# Patient Record
Sex: MALE | Race: WHITE | NOT HISPANIC OR LATINO | Employment: FULL TIME | URBAN - NONMETROPOLITAN AREA
[De-identification: names, ages, dates, MRNs, and addresses within clinical notes are randomized per-mention and may not be internally consistent; named-entity substitution may affect disease eponyms.]

---

## 2019-05-09 ENCOUNTER — APPOINTMENT (OUTPATIENT)
Dept: GENERAL RADIOLOGY | Facility: HOSPITAL | Age: 24
End: 2019-05-09

## 2019-05-09 ENCOUNTER — HOSPITAL ENCOUNTER (EMERGENCY)
Facility: HOSPITAL | Age: 24
Discharge: HOME | End: 2019-05-09
Admitting: EMERGENCY MEDICINE

## 2019-05-09 VITALS
DIASTOLIC BLOOD PRESSURE: 88 MMHG | BODY MASS INDEX: 34.55 KG/M2 | SYSTOLIC BLOOD PRESSURE: 137 MMHG | HEIGHT: 66 IN | TEMPERATURE: 98.6 F | OXYGEN SATURATION: 98 % | HEART RATE: 100 BPM | WEIGHT: 215 LBS | RESPIRATION RATE: 14 BRPM

## 2019-05-09 DIAGNOSIS — S86.912A KNEE STRAIN, LEFT, INITIAL ENCOUNTER: Primary | ICD-10-CM

## 2019-05-09 PROCEDURE — 99283 EMERGENCY DEPT VISIT LOW MDM: CPT

## 2019-05-09 PROCEDURE — 73562 X-RAY EXAM OF KNEE 3: CPT

## 2019-05-09 RX ORDER — NAPROXEN 500 MG/1
500 TABLET ORAL 2 TIMES DAILY PRN
Qty: 15 TABLET | Refills: 0 | Status: SHIPPED | OUTPATIENT
Start: 2019-05-09

## 2019-05-09 RX ORDER — HYDROCODONE BITARTRATE AND ACETAMINOPHEN 5; 325 MG/1; MG/1
1 TABLET ORAL EVERY 4 HOURS PRN
Qty: 12 TABLET | Refills: 0 | Status: SHIPPED | OUTPATIENT
Start: 2019-05-09

## 2019-05-09 NOTE — DISCHARGE INSTRUCTIONS
Avoid weight bearing; use crutches and knee immobilizer with ambulation; f/u with orthopedics for further evaluation- Dr. Valdes on call

## 2019-05-09 NOTE — ED PROVIDER NOTES
Subjective     Lower Extremity Issue   Location:  Knee  Injury: yes    Mechanism of injury comment:  Reports getting out of bed and twisted left knee, felt a pop and questioned a patella dislocation that was reduced by straightening the leg  Knee location:  L knee  Pain details:     Quality:  Throbbing    Severity:  Moderate    Onset quality:  Sudden    Timing:  Constant  Chronicity:  New  Relieved by:  Rest  Worsened by:  Bearing weight  Associated symptoms: decreased ROM and swelling    Associated symptoms: no fever, no neck pain, no numbness and no stiffness    Risk factors: no concern for non-accidental trauma        Review of Systems   Constitutional: Negative for fever.   HENT: Negative for congestion.    Respiratory: Negative for cough.    Gastrointestinal: Negative for abdominal pain and vomiting.   Musculoskeletal: Positive for gait problem. Negative for myalgias, neck pain, neck stiffness and stiffness.        Positive for left knee pain, unable to bear weight secondary to left knee pain   Skin: Negative for color change, pallor, rash and wound.   All other systems reviewed and are negative.      History reviewed. No pertinent past medical history.    No Known Allergies    History reviewed. No pertinent surgical history.    History reviewed. No pertinent family history.    Social History     Socioeconomic History   • Marital status: Single     Spouse name: Not on file   • Number of children: Not on file   • Years of education: Not on file   • Highest education level: Not on file   Tobacco Use   • Smoking status: Former Smoker   Substance and Sexual Activity   • Alcohol use: Yes     Frequency: Never     Comment: 6/PACK           Objective   Physical Exam   Constitutional: He is oriented to person, place, and time. He appears well-developed and well-nourished. No distress.   HENT:   Head: Normocephalic and atraumatic.   Right Ear: External ear normal.   Left Ear: External ear normal.   Nose: Nose normal.    Eyes: Conjunctivae and EOM are normal. Pupils are equal, round, and reactive to light. No scleral icterus.   Neck: Normal range of motion. Neck supple. No JVD present. No thyromegaly present.   Cardiovascular: Normal rate, regular rhythm, normal heart sounds and intact distal pulses.   No murmur heard.  Pulmonary/Chest: Effort normal and breath sounds normal. No respiratory distress. He has no wheezes. He has no rales. He exhibits no tenderness.   Abdominal: Soft. Bowel sounds are normal. He exhibits no distension and no mass. There is no tenderness. There is no rebound and no guarding.   Musculoskeletal: Normal range of motion. He exhibits edema and tenderness.   Pain to palpation to the left knee in particular medial aspect of the left knee, mild swelling noted, neurovascular intact, no obvious deformity, limited range of motion secondary to pain, sensory intact   Lymphadenopathy:     He has no cervical adenopathy.   Neurological: He is alert and oriented to person, place, and time. He has normal reflexes. No cranial nerve deficit. Coordination normal.   Skin: Skin is warm and dry. Capillary refill takes less than 2 seconds. No rash noted. He is not diaphoretic. No erythema. No pallor.   Psychiatric: He has a normal mood and affect. His behavior is normal. Judgment and thought content normal.   Nursing note and vitals reviewed.      Procedures           ED Course xray is negative. Patient will be placed in knee immobilizer and crutches. Recommend no weight bearing and follow up with orthopedics for further evaluation and treatment.                  MDM  Number of Diagnoses or Management Options  Knee strain, left, initial encounter: new and requires workup     Amount and/or Complexity of Data Reviewed  Tests in the radiology section of CPT®: reviewed and ordered  Discuss the patient with other providers: yes    Risk of Complications, Morbidity, and/or Mortality  Presenting problems: low  Diagnostic procedures:  low  Management options: low    Patient Progress  Patient progress: improved        Final diagnoses:   Knee strain, left, initial encounter            Etta Pack, APRN  05/09/19 1124

## 2019-08-28 ENCOUNTER — TRANSCRIBE ORDERS (OUTPATIENT)
Dept: ADMINISTRATIVE | Facility: HOSPITAL | Age: 24
End: 2019-08-28

## 2019-08-28 DIAGNOSIS — M25.562 LEFT KNEE PAIN, UNSPECIFIED CHRONICITY: Primary | ICD-10-CM

## 2019-09-06 ENCOUNTER — HOSPITAL ENCOUNTER (OUTPATIENT)
Dept: MRI IMAGING | Facility: HOSPITAL | Age: 24
Discharge: HOME | End: 2019-09-06
Admitting: NURSE PRACTITIONER

## 2019-09-06 DIAGNOSIS — M25.562 LEFT KNEE PAIN, UNSPECIFIED CHRONICITY: ICD-10-CM

## 2019-09-06 PROCEDURE — 73721 MRI JNT OF LWR EXTRE W/O DYE: CPT

## 2020-12-03 ENCOUNTER — OFFICE VISIT (OUTPATIENT)
Age: 25
End: 2020-12-03

## 2020-12-03 VITALS — OXYGEN SATURATION: 98 % | HEART RATE: 99 BPM | TEMPERATURE: 98 F

## 2020-12-03 PROCEDURE — 99999 PR OFFICE/OUTPT VISIT,PROCEDURE ONLY: CPT | Performed by: NURSE PRACTITIONER

## 2020-12-06 LAB — SARS-COV-2, NAA: DETECTED

## 2022-12-13 ENCOUNTER — OFFICE VISIT (OUTPATIENT)
Age: 27
End: 2022-12-13

## 2022-12-13 VITALS
RESPIRATION RATE: 17 BRPM | TEMPERATURE: 98.4 F | DIASTOLIC BLOOD PRESSURE: 64 MMHG | WEIGHT: 206 LBS | HEIGHT: 67 IN | HEART RATE: 120 BPM | BODY MASS INDEX: 32.33 KG/M2 | OXYGEN SATURATION: 97 % | SYSTOLIC BLOOD PRESSURE: 128 MMHG

## 2022-12-13 DIAGNOSIS — R50.9 FEVER, UNSPECIFIED FEVER CAUSE: ICD-10-CM

## 2022-12-13 DIAGNOSIS — J02.9 SORE THROAT: Primary | ICD-10-CM

## 2022-12-13 DIAGNOSIS — J10.1 INFLUENZA A: ICD-10-CM

## 2022-12-13 DIAGNOSIS — R05.9 COUGH, UNSPECIFIED TYPE: ICD-10-CM

## 2022-12-13 LAB
INFLUENZA A ANTIBODY: POSITIVE
INFLUENZA B ANTIBODY: ABNORMAL
S PYO AG THROAT QL: NORMAL

## 2022-12-13 PROCEDURE — 99213 OFFICE O/P EST LOW 20 MIN: CPT | Performed by: NURSE PRACTITIONER

## 2022-12-13 PROCEDURE — 87804 INFLUENZA ASSAY W/OPTIC: CPT | Performed by: NURSE PRACTITIONER

## 2022-12-13 PROCEDURE — 87880 STREP A ASSAY W/OPTIC: CPT | Performed by: NURSE PRACTITIONER

## 2022-12-13 RX ORDER — BENZONATATE 200 MG/1
200 CAPSULE ORAL 3 TIMES DAILY PRN
Qty: 30 CAPSULE | Refills: 0 | Status: SHIPPED | OUTPATIENT
Start: 2022-12-13 | End: 2022-12-20

## 2022-12-13 ASSESSMENT — ENCOUNTER SYMPTOMS
SORE THROAT: 1
ABDOMINAL PAIN: 0
SHORTNESS OF BREATH: 0
SINUS PRESSURE: 0
NAUSEA: 0
EYES NEGATIVE: 1
RHINORRHEA: 1
DIARRHEA: 0
ALLERGIC/IMMUNOLOGIC NEGATIVE: 1
COUGH: 1
VOMITING: 0

## 2022-12-13 ASSESSMENT — VISUAL ACUITY: OU: 1

## 2022-12-13 NOTE — PATIENT INSTRUCTIONS
Plenty of fluids  Rest  OTC Tylenol or Motrin as needed   Needs work/school excuse For Monday until next Monday  Tessalon pearles as directed  Follow up with PCP or return to Urgent Care for worsening or unresolved symptoms.

## 2022-12-13 NOTE — LETTER
[facility]  [address] [address]  Phone: [phone]  Fax: [phone]    LOVE Newby CNP        December 13, 2022     Patient: Crystal Nguyen   YOB: 1995   Date of Visit: 12/13/2022       To Whom It May Concern: It is my medical opinion that Crystal Nguyen should remain out of work until 12/19/22. Please excuse his prior absence on 12/12/22    If you have any questions or concerns, please don't hesitate to call.     Sincerely,        LOVE Newby CNP

## 2022-12-13 NOTE — PROGRESS NOTES
[id number]  [address] [address] [place] [address]  Dept: [phone]  Dept Fax: [phone]  Loc: [phone]    Charity Nguyen is a 24 y.o. male who presents today for his medical conditions/complaintsas noted below. Charity Nguyen is c/o of Cough, Congestion, Pharyngitis, Fever, Generalized Body Aches, Headache, and Fatigue        HPI:     Cough  This is a new problem. The current episode started in the past 7 days (since Sunday). The problem has been unchanged. The problem occurs every few minutes. The cough is Non-productive. Associated symptoms include chills, a fever, headaches, myalgias, nasal congestion, rhinorrhea, a sore throat and sweats. Pertinent negatives include no rash or shortness of breath. Associated symptoms comments: Fatigue. Nothing aggravates the symptoms. Risk factors for lung disease include smoking/tobacco exposure. He has tried rest and cool air (Amoxicillin) for the symptoms. The treatment provided mild relief. He tells me he has had fever and fatigue. He took some Amoxicillin his fiance gave him today. She and his son are also sick and here to be seen. No past medical history on file. No past surgical history on file. No family history on file. Social History     Tobacco Use    Smoking status: Every Day     Years: 3.00     Types: Cigarettes    Smokeless tobacco: Never   Substance Use Topics    Alcohol use: Never      Current Outpatient Medications   Medication Sig Dispense Refill    benzonatate (TESSALON) 200 MG capsule Take 1 capsule by mouth 3 times daily as needed for Cough 30 capsule 0     No current facility-administered medications for this visit.      No Known Allergies    Health Maintenance   Topic Date Due    COVID-19 Vaccine (1) Never done    Varicella vaccine (1 of 2 - 2-dose childhood series) Never done    HPV vaccine (1 - Male 2-dose series) Never done    Depression Screen  Never done    HIV screen Never done    Hepatitis C screen  Never done    DTaP/Tdap/Td vaccine (1 - Tdap) Never done    Flu vaccine (1) Never done    Hepatitis A vaccine  Aged Out    Hib vaccine  Aged Out    Meningococcal (ACWY) vaccine  Aged Out    Pneumococcal 0-64 years Vaccine  Aged Out       Subjective:     Review of Systems   Constitutional:  Positive for chills, fatigue and fever. Negative for activity change and appetite change. HENT:  Positive for congestion, rhinorrhea and sore throat. Negative for ear discharge and sinus pressure. Eyes: Negative. Respiratory:  Positive for cough. Negative for shortness of breath. Cardiovascular: Negative. Gastrointestinal:  Negative for abdominal pain, diarrhea, nausea and vomiting. Musculoskeletal:  Positive for myalgias. Negative for arthralgias. Skin:  Negative for rash. Allergic/Immunologic: Negative. Neurological:  Positive for headaches.     :Objective      Physical Exam  Vitals and nursing note reviewed. Constitutional:       General: He is awake. He is not in acute distress. Appearance: Normal appearance. He is well-developed, well-groomed and overweight. He is ill-appearing. HENT:      Head: Normocephalic. Right Ear: Hearing, tympanic membrane, ear canal and external ear normal.      Left Ear: Hearing, tympanic membrane, ear canal and external ear normal.      Nose: Congestion present. Mouth/Throat:      Lips: Pink. Mouth: Mucous membranes are moist.      Pharynx: Oropharynx is clear. Uvula midline. Tonsils: 0 on the right. 0 on the left. Eyes:      General: Lids are normal. Vision grossly intact. Conjunctiva/sclera: Conjunctivae normal.   Neck:      Trachea: Phonation normal.   Cardiovascular:      Rate and Rhythm: Regular rhythm. Tachycardia present. Heart sounds: Normal heart sounds, S1 normal and S2 normal. No murmur heard. No friction rub. No gallop.    Pulmonary:      Effort: Pulmonary effort is normal. No respiratory distress. Breath sounds: Normal breath sounds and air entry. No wheezing, rhonchi or rales. Abdominal:      General: Abdomen is flat. Bowel sounds are normal.      Palpations: Abdomen is soft. Tenderness: There is no abdominal tenderness. Musculoskeletal:         General: No tenderness or deformity. Normal range of motion. Cervical back: Full passive range of motion without pain, normal range of motion and neck supple. Lymphadenopathy:      Head:      Right side of head: No tonsillar adenopathy. Left side of head: No tonsillar adenopathy. Skin:     General: Skin is warm and dry. Capillary Refill: Capillary refill takes less than 2 seconds. Neurological:      General: No focal deficit present. Mental Status: He is alert, oriented to person, place, and time and easily aroused. Mental status is at baseline. Psychiatric:         Attention and Perception: Attention normal.         Mood and Affect: Mood normal.         Speech: Speech normal.         Behavior: Behavior normal. Behavior is cooperative. /64   Pulse (!) 120   Temp 98.4 °F (36.9 °C)   Resp 17   Ht 5' 7\" (1.702 m)   Wt 206 lb (93.4 kg)   SpO2 97%   BMI 32.26 kg/m²     :Assessment       Diagnosis Orders   1. Sore throat  POCT rapid strep A      2. Fever, unspecified fever cause        3. Influenza A  benzonatate (TESSALON) 200 MG capsule      4.  Cough, unspecified type  benzonatate (TESSALON) 200 MG capsule          :Plan      Orders Placed This Encounter   Procedures    POCT Influenza A/B    POCT rapid strep A     Results for orders placed or performed in visit on 12/13/22   POCT Influenza A/B   Result Value Ref Range    Influenza A Ab positive     Influenza B Ab neg    POCT rapid strep A   Result Value Ref Range    Strep A Ag None Detected None Detected     Plenty of fluids  Rest  OTC Tylenol or Motrin as needed   Needs work/school excuse For Monday until next Monday  Tessalon pearles as directed  Follow up with PCP or return to Urgent Care for worsening or unresolved symptoms. No follow-ups on file. Orders Placed This Encounter   Medications    benzonatate (TESSALON) 200 MG capsule     Sig: Take 1 capsule by mouth 3 times daily as needed for Cough     Dispense:  30 capsule     Refill:  0        Patient Instructions   Plenty of fluids  Rest  OTC Tylenol or Motrin as needed   Needs work/school excuse For Monday until next Monday  Tessalon pearles as directed  Follow up with PCP or return to Urgent Care for worsening or unresolved symptoms. Patient given educational materials- see patient instructions. Discussed use, benefit, and side effects of prescribedmedications. All patient questions answered. Pt voiced understanding.        Electronically signed by LOVE Jones CNP on 12/13/2022 at 2:28 PM

## 2024-11-12 ENCOUNTER — APPOINTMENT (OUTPATIENT)
Dept: GENERAL RADIOLOGY | Age: 29
End: 2024-11-12
Payer: COMMERCIAL

## 2024-11-12 ENCOUNTER — HOSPITAL ENCOUNTER (EMERGENCY)
Age: 29
Discharge: HOME | End: 2024-11-12
Payer: COMMERCIAL

## 2024-11-12 VITALS
TEMPERATURE: 98.5 F | HEART RATE: 91 BPM | RESPIRATION RATE: 18 BRPM | DIASTOLIC BLOOD PRESSURE: 91 MMHG | SYSTOLIC BLOOD PRESSURE: 146 MMHG | OXYGEN SATURATION: 98 %

## 2024-11-12 DIAGNOSIS — S91.331A PUNCTURE WOUND OF RIGHT FOOT, INITIAL ENCOUNTER: Primary | ICD-10-CM

## 2024-11-12 PROCEDURE — 73630 X-RAY EXAM OF FOOT: CPT

## 2024-11-12 PROCEDURE — 6360000002 HC RX W HCPCS: Performed by: NURSE PRACTITIONER

## 2024-11-12 PROCEDURE — 90715 TDAP VACCINE 7 YRS/> IM: CPT | Performed by: NURSE PRACTITIONER

## 2024-11-12 PROCEDURE — 99284 EMERGENCY DEPT VISIT MOD MDM: CPT

## 2024-11-12 PROCEDURE — 90471 IMMUNIZATION ADMIN: CPT | Performed by: NURSE PRACTITIONER

## 2024-11-12 PROCEDURE — 6370000000 HC RX 637 (ALT 250 FOR IP): Performed by: NURSE PRACTITIONER

## 2024-11-12 RX ORDER — ONDANSETRON 4 MG/1
4 TABLET, ORALLY DISINTEGRATING ORAL ONCE
Status: COMPLETED | OUTPATIENT
Start: 2024-11-12 | End: 2024-11-12

## 2024-11-12 RX ORDER — LEVOFLOXACIN 500 MG/1
500 TABLET, FILM COATED ORAL DAILY
Qty: 7 TABLET | Refills: 0 | Status: SHIPPED | OUTPATIENT
Start: 2024-11-12 | End: 2024-11-19

## 2024-11-12 RX ORDER — OXYCODONE AND ACETAMINOPHEN 5; 325 MG/1; MG/1
1 TABLET ORAL ONCE
Status: COMPLETED | OUTPATIENT
Start: 2024-11-12 | End: 2024-11-12

## 2024-11-12 RX ORDER — DICLOFENAC SODIUM 75 MG/1
75 TABLET, DELAYED RELEASE ORAL 2 TIMES DAILY
Qty: 60 TABLET | Refills: 3 | Status: SHIPPED | OUTPATIENT
Start: 2024-11-12

## 2024-11-12 RX ADMIN — TETANUS TOXOID, REDUCED DIPHTHERIA TOXOID AND ACELLULAR PERTUSSIS VACCINE, ADSORBED 0.5 ML: 5; 2.5; 8; 8; 2.5 SUSPENSION INTRAMUSCULAR at 15:32

## 2024-11-12 RX ADMIN — ONDANSETRON 4 MG: 4 TABLET, ORALLY DISINTEGRATING ORAL at 17:03

## 2024-11-12 RX ADMIN — OXYCODONE HYDROCHLORIDE AND ACETAMINOPHEN 1 TABLET: 5; 325 TABLET ORAL at 17:03

## 2024-11-12 ASSESSMENT — ENCOUNTER SYMPTOMS: RESPIRATORY NEGATIVE: 1

## 2024-11-12 ASSESSMENT — PAIN SCALES - GENERAL: PAINLEVEL_OUTOF10: 7

## 2024-11-12 NOTE — DISCHARGE INSTRUCTIONS
All of the antibiotic.  Follow-up with Dr. Hairston at the orthopedic [place] for reevaluation.  Call tomorrow for appointment.  No work until released by Dr. Hairston.  Return to ER for any new, worsening, or change in condition.   Diclofenac as prescribed.

## 2024-11-12 NOTE — ED PROVIDER NOTES
[facility] EMERGENCY DEPT  EMERGENCY DEPARTMENT ENCOUNTER      Pt Name: Ryan Nguyen  MRN: [id number]  Birthdate 1995  Date of evaluation: 11/12/2024  Provider: LOVE Tinoco NP    CHIEF COMPLAINT       Chief Complaint   Patient presents with    Puncture Wound     Stepped on nail, right foot         HISTORY OF PRESENT ILLNESS   (Location/Symptom, Timing/Onset,Context/Setting, Quality, Duration, Modifying Factors, Severity)  Note limiting factors.   Ryan Nguyen is a 29 y.o. male who presents to the emergency department with a puncture wound to his right foot.  Patient stepped on a nail that went through his boot.  Injury is to the ball of his foot lateral side.  His tetanus is not up-to-date.            NursingNotes were reviewed.    REVIEW OF SYSTEMS    (2-9 systems for level 4, 10 or more for level 5)     Review of Systems   Reason unable to perform ROS: As per HPI.   Constitutional: Negative.    Respiratory: Negative.     Cardiovascular: Negative.    Musculoskeletal:  Positive for myalgias.   Skin:  Positive for wound.   Neurological:  Negative for numbness.   All other systems reviewed and are negative.      A complete review of systems was performed and is negative except as noted above in the HPI.       PAST MEDICAL HISTORY   History reviewed. No pertinent past medical history.      SURGICAL HISTORY     History reviewed. No pertinent surgical history.      CURRENT MEDICATIONS       Discharge Medication List as of 11/12/2024  5:24 PM          ALLERGIES     Patient has no known allergies.    FAMILY HISTORY     History reviewed. No pertinent family history.       SOCIAL HISTORY       Social History     Socioeconomic History    Marital status:      Spouse name: None    Number of children: None    Years of education: None    Highest education level: None   Tobacco Use    Smoking status: Former     Types: Cigarettes    Smokeless tobacco: Never   Substance and Sexual Activity    Alcohol use: Yes

## 2024-11-14 ENCOUNTER — OFFICE VISIT (OUTPATIENT)
Age: 29
End: 2024-11-14

## 2024-11-14 VITALS — WEIGHT: 215 LBS | HEIGHT: 67 IN | BODY MASS INDEX: 33.74 KG/M2

## 2024-11-14 DIAGNOSIS — M79.671 PAIN IN RIGHT FOOT: ICD-10-CM

## 2024-11-14 DIAGNOSIS — S91.331A PUNCTURE WOUND OF RIGHT FOOT, INITIAL ENCOUNTER: Primary | ICD-10-CM

## 2024-11-14 NOTE — PROGRESS NOTES
[facility]  [facility]  [address]  [facility] [address]  [phone]     Patient: Ryan Nguyen   YOB: 1995   Date: 11/14/2024   Visit Type:  Consult    Body Part: foot right    When did the symptoms being/Date of Onset or date of surgery? Nov 12th    Where did the injury happen? Work    How did the injury happen? Stepped on a set of nails and went through his foot.     If over 55, have you trujillo an Osteoporosis Screening in the last 2 years? NA    History of Present Illness  Chief Complaint   Patient presents with    Consultation     Right foot       This is a 29 y.o. male  presents today complaining of right foot pain.  He had a puncture wound with a tylor dirty nail to the right foot on November 12, 2024.  He did present to the urgent care yesterday and then to the emergency room at [facility].  He did have x-rays taken.  He is currently on diclofenac for pain and inflammation as well as Levaquin.  He denies any fever, chills, nausea, vomiting, shortness of breath    Tetanus updated at the ER.  Review of Systems  System  Neg/Pos  Details  Constitutional  Negative  Chills, Fatigue, Fever and Night Sweats  Respiratory  Negative  Chest Pain, Cough and Dyspnea  Cardio   Negative  Leg Swelling  GI   Negative  Abdominal Pain, Constipation, Nausea and Vomiting     Negative  Urinary Incontinence   Endocrine  Negative  Weight Gain and Weight Loss  MS   Negative  Except as noted in HPI and Chief Complaint    History reviewed. No pertinent past medical history.   History reviewed. No pertinent surgical history.   Social History     Socioeconomic History    Marital status:      Spouse name: None    Number of children: None    Years of education: None    Highest education level: None   Tobacco Use    Smoking status: Former     Types: Cigarettes    Smokeless tobacco: Never   Substance and Sexual Activity    Alcohol use: Yes

## 2024-11-18 ENCOUNTER — OFFICE VISIT (OUTPATIENT)
Age: 29
End: 2024-11-18
Payer: COMMERCIAL

## 2024-11-18 VITALS — HEIGHT: 67 IN | WEIGHT: 215 LBS | BODY MASS INDEX: 33.74 KG/M2

## 2024-11-18 DIAGNOSIS — S91.331D PUNCTURE WOUND OF RIGHT FOOT, SUBSEQUENT ENCOUNTER: Primary | ICD-10-CM

## 2024-11-18 DIAGNOSIS — M79.671 PAIN IN RIGHT FOOT: ICD-10-CM

## 2024-11-18 PROCEDURE — 99213 OFFICE O/P EST LOW 20 MIN: CPT | Performed by: PODIATRIST

## 2024-11-18 NOTE — PROGRESS NOTES
[facility]  [facility]  [address]  [facility] [address]  [phone]     Patient: Ryan Nguyen   YOB: 1995   Date: 11/18/2024   Visit Type:  Follow up    Body Part:  right foot    When did the symptoms being/Date of Onset or date of surgery? 11/12/24    Where did the injury happen? Work    How did the injury happen? Stepped on nails         History of Present Illness  Chief Complaint   Patient presents with    Follow-up     Rt foot        This is a 29 y.o. male  presents today complaining of continued pain in his right foot.  Denies any improvement since his last visit.  Denies any fever, chills, nausea, vomiting.    Review of Systems  System  Neg/Pos  Details  Constitutional  Negative  Chills, Fatigue, Fever and Night Sweats  Respiratory  Negative  Chest Pain, Cough and Dyspnea  Cardio   Negative  Leg Swelling  GI   Negative  Abdominal Pain, Constipation, Nausea and Vomiting     Negative  Urinary Incontinence   Endocrine  Negative  Weight Gain and Weight Loss  MS   Negative  Except as noted in HPI and Chief Complaint    History reviewed. No pertinent past medical history.   History reviewed. No pertinent surgical history.   Social History     Socioeconomic History    Marital status:      Spouse name: None    Number of children: None    Years of education: None    Highest education level: None   Tobacco Use    Smoking status: Former     Types: Cigarettes    Smokeless tobacco: Never   Substance and Sexual Activity    Alcohol use: Yes     Comment: occ    Drug use: Never     Social Determinants of Health      Received from [facility]    Family and Community Support    Received from [facility]    Abuse Screen    Received from [facility]    Housing Stability      Social History     Occupational History    Not on file   Tobacco Use    Smoking status: Former     Types:

## 2024-11-20 DIAGNOSIS — S91.331D PUNCTURE WOUND OF RIGHT FOOT, SUBSEQUENT ENCOUNTER: Primary | ICD-10-CM

## 2024-11-20 RX ORDER — OXYCODONE AND ACETAMINOPHEN 7.5; 325 MG/1; MG/1
1 TABLET ORAL EVERY 4 HOURS PRN
Qty: 16 TABLET | Refills: 0 | Status: SHIPPED | OUTPATIENT
Start: 2024-11-20 | End: 2024-11-25

## 2024-11-20 RX ORDER — ONDANSETRON 4 MG/1
4 TABLET, FILM COATED ORAL EVERY 8 HOURS PRN
Qty: 20 TABLET | Refills: 0 | Status: SHIPPED | OUTPATIENT
Start: 2024-11-20

## 2024-11-21 ENCOUNTER — TELEPHONE (OUTPATIENT)
Age: 29
End: 2024-11-21

## 2024-11-21 NOTE — TELEPHONE ENCOUNTER
Consulted with Dr Hairston then informed the patient he is to pull the drain today then keep the area clean and covered with dry dressing.

## 2024-11-21 NOTE — TELEPHONE ENCOUNTER
Pt sister wanted to know if she should removed the drain that Dr farfan placed in his foot or leave it until the post op appointment. Please reach out to pt to talk about sx questions.

## 2024-11-26 ENCOUNTER — HOSPITAL ENCOUNTER (OUTPATIENT)
Dept: ULTRASOUND IMAGING | Facility: HOSPITAL | Age: 29
Discharge: HOME | End: 2024-11-26
Admitting: NURSE PRACTITIONER
Payer: OTHER MISCELLANEOUS

## 2024-11-26 ENCOUNTER — OFFICE VISIT (OUTPATIENT)
Age: 29
End: 2024-11-26

## 2024-11-26 ENCOUNTER — TRANSCRIBE ORDERS (OUTPATIENT)
Dept: ADMINISTRATIVE | Facility: HOSPITAL | Age: 29
End: 2024-11-26
Payer: COMMERCIAL

## 2024-11-26 ENCOUNTER — TELEPHONE (OUTPATIENT)
Age: 29
End: 2024-11-26

## 2024-11-26 VITALS — HEIGHT: 67 IN | BODY MASS INDEX: 33.74 KG/M2 | WEIGHT: 215 LBS

## 2024-11-26 DIAGNOSIS — M79.661 RIGHT CALF PAIN: ICD-10-CM

## 2024-11-26 DIAGNOSIS — S91.331D PUNCTURE WOUND OF FOOT, RIGHT, SUBSEQUENT ENCOUNTER: Primary | ICD-10-CM

## 2024-11-26 DIAGNOSIS — S91.331D PUNCTURE WOUND OF FOOT, RIGHT, SUBSEQUENT ENCOUNTER: ICD-10-CM

## 2024-11-26 DIAGNOSIS — S91.331D PUNCTURE WOUND OF RIGHT FOOT, SUBSEQUENT ENCOUNTER: Primary | ICD-10-CM

## 2024-11-26 PROCEDURE — 93971 EXTREMITY STUDY: CPT

## 2024-11-26 NOTE — TELEPHONE ENCOUNTER
Pt was seen here today and was told to go to [employer] to get a knee scooter. Pt is there now and they will not give him one because this is a WC Claim. Can the pt get one somewhere else? Please call pt with advice.

## 2024-11-26 NOTE — PROGRESS NOTES
[facility]  [facility]  [address]  [facility] [address]  [phone]     Patient: Ryan Nguyen   YOB: 1995   Date: 11/26/2024   Visit Type:      History of Present Illness  Chief Complaint   Patient presents with    Follow-up     Right foot       This is a 29 y.o. male presents today 1 week status post incision drainage right foot due to puncture wound.  He is nonweightbearing.  Does relate decrease in pain.  He is currently off antibiotics.  He does relate some right lower extremity calf pain and discomfort.  He is utilizing crutches.  He would like to get an order for a knee scooter.  He is off work.  He comes in today for evaluation.    History reviewed. No pertinent past medical history.   Past Surgical History:   Procedure Laterality Date    FOOT SURGERY        Social History     Socioeconomic History    Marital status:      Spouse name: None    Number of children: None    Years of education: None    Highest education level: None   Tobacco Use    Smoking status: Former     Types: Cigarettes    Smokeless tobacco: Never   Substance and Sexual Activity    Alcohol use: Yes     Comment: occ    Drug use: Never     Social Determinants of Health      Received from [facility]    Family and Community Support    Received from [facility]    Abuse Screen    Received from [facility]    Housing Stability      Social History     Occupational History    Not on file   Tobacco Use    Smoking status: Former     Types: Cigarettes    Smokeless tobacco: Never   Substance and Sexual Activity    Alcohol use: Yes     Comment: occ    Drug use: Never    Sexual activity: Not on file        Tobacco Use      Smoking status: Former        Types: Cigarettes      Smokeless tobacco: Never     No family history on file.     Medications  Current Outpatient Medications   Medication Sig Dispense Refill

## 2024-11-26 NOTE — PROGRESS NOTES
[facility]  [facility]  [address]  [facility] [id number] [phone]     Patient: Ryan Nguyen   YOB: 1995   Date: 11/26/2024   Visit Type:  Post op    Body Part: Foot right    What was the date of surgery? 11/20/24    Pain medication refill? Yes    Review of Systems    Review of Systems

## 2024-11-26 NOTE — PATIENT INSTRUCTIONS
Clean incision with topical Betadine solution daily.  Keep covered dry bandage.  Monitor for any increase in redness, drainage, or odor or any other signs symptoms of infection.  Continue to be nonweightbearing.

## 2024-12-02 ENCOUNTER — TELEPHONE (OUTPATIENT)
Age: 29
End: 2024-12-02

## 2024-12-02 DIAGNOSIS — S91.331D PUNCTURE WOUND OF RIGHT FOOT, SUBSEQUENT ENCOUNTER: Primary | ICD-10-CM

## 2024-12-02 RX ORDER — HYDROCODONE BITARTRATE AND ACETAMINOPHEN 10; 325 MG/1; MG/1
1 TABLET ORAL EVERY 6 HOURS PRN
Qty: 15 TABLET | Refills: 0 | Status: SHIPPED | OUTPATIENT
Start: 2024-12-02 | End: 2024-12-07

## 2024-12-02 NOTE — TELEPHONE ENCOUNTER
Pt wife called and said that he was supposed to have pain meds sent in but he never got them they need [facility] [place]

## 2024-12-02 NOTE — TELEPHONE ENCOUNTER
Called patient to let him know the disability paperwork he turned in on 11/26/24 was completed and ready for . Wife answered phone and said they would  today. Also, asked about pain meds. I called and spoke to trip and he said he would ask Maya to send in a refill

## 2024-12-06 ENCOUNTER — TELEPHONE (OUTPATIENT)
Age: 29
End: 2024-12-06

## 2024-12-06 NOTE — TELEPHONE ENCOUNTER
Refill on pain meds norco 10-325mg [facility] [place] - Patient states [employer] would only pay 3 days worth (12 quantity) last time. So he is currently out.

## 2024-12-09 DIAGNOSIS — S91.331D PUNCTURE WOUND OF RIGHT FOOT, SUBSEQUENT ENCOUNTER: Primary | ICD-10-CM

## 2024-12-09 RX ORDER — HYDROCODONE BITARTRATE AND ACETAMINOPHEN 7.5; 325 MG/1; MG/1
1 TABLET ORAL EVERY 8 HOURS PRN
Qty: 12 TABLET | Refills: 0 | Status: SHIPPED | OUTPATIENT
Start: 2024-12-09 | End: 2024-12-13

## 2024-12-12 ENCOUNTER — OFFICE VISIT (OUTPATIENT)
Age: 29
End: 2024-12-12

## 2024-12-12 VITALS — BODY MASS INDEX: 33.74 KG/M2 | HEIGHT: 67 IN | WEIGHT: 215 LBS

## 2024-12-12 DIAGNOSIS — Z47.89 AFTERCARE FOLLOWING SURGERY OF THE MUSCULOSKELETAL SYSTEM: Primary | ICD-10-CM

## 2024-12-12 PROCEDURE — 99024 POSTOP FOLLOW-UP VISIT: CPT | Performed by: PODIATRIST

## 2024-12-12 RX ORDER — IBUPROFEN 800 MG/1
800 TABLET, FILM COATED ORAL EVERY 6 HOURS PRN
COMMUNITY

## 2024-12-12 RX ORDER — MELOXICAM 15 MG/1
15 TABLET ORAL DAILY
Qty: 30 TABLET | Refills: 0 | Status: SHIPPED | OUTPATIENT
Start: 2024-12-12

## 2024-12-12 NOTE — PROGRESS NOTES
Known Allergies     Ht 1.702 m (5' 7\")   Wt 97.5 kg (215 lb)   BMI 33.67 kg/m²      Physical Exam   Physical Examination:  General: The patient is a Well Nourished 29 y.o. male who is alert and oriented x3 in no distress. The patient is cooperative during the exam.  Psychological: Is a pleasant male, good mood and affect, answers questions appropriately.  Head and Neck: Normocephalic, Atraumatic. Neck supple, no evidence of masses.   Abdomen: Soft, nontender, nondistended.  Eyes: Perrla. Extraocular movements intact.   Respiratory: Rate and effort within normal limits.   Vascular: Mild edema to the right foot today.  No evidence of infection.          Plan    ICD-10-CM    1. Aftercare following surgery of the musculoskeletal system  Z47.89 Amb External Referral To Physical Therapy           Plan Narrative  I discussed his condition with him today.  Sutures removed today.  A light bandage was applied like him to stay in a surgical shoe for 5 to 7 days.  He may return to work light duty on 12/23/2024 and he may return to work full duty on January 6, 2025.  I like see him back in 3 months and we can do his maximal medical improvement at that point.  I did give him a prescription for meloxicam 15 mg #30.  1 tablet p.o. daily with food over the next 30 days.  I do want to have a short course of physical therapy prior to his return to work full duty.  An order for that was given today.      Return in about 3 months (around 3/12/2025) for MMI.      Patient given educational materials - see patient instructions.   Discussed use, benefit, and side effects of prescribed medications.  All patient questions answered.  Pt voiced understanding. Patient agreed with treatment plan. Follow up as needed.    This dictation was generated by voice recognition computer software. Although all attempts are made to edit the dictation for accuracy, there may be errors in the transcription that are not intended.    Electronically signed by

## 2025-03-31 ENCOUNTER — OFFICE VISIT (OUTPATIENT)
Age: 30
End: 2025-03-31
Payer: COMMERCIAL

## 2025-03-31 VITALS — BODY MASS INDEX: 31.39 KG/M2 | HEIGHT: 67 IN | WEIGHT: 200 LBS

## 2025-03-31 DIAGNOSIS — S91.331D PUNCTURE WOUND OF RIGHT FOOT, SUBSEQUENT ENCOUNTER: Primary | ICD-10-CM

## 2025-03-31 PROCEDURE — 99213 OFFICE O/P EST LOW 20 MIN: CPT | Performed by: NURSE PRACTITIONER

## 2025-03-31 ASSESSMENT — ENCOUNTER SYMPTOMS
VOMITING: 0
CONSTIPATION: 0
NAUSEA: 0
SHORTNESS OF BREATH: 0
DIARRHEA: 0
COUGH: 0
COLOR CHANGE: 0
BLOOD IN STOOL: 0

## 2025-03-31 NOTE — PROGRESS NOTES
[facility]  [facility]  [address]  [facility] [address]  [phone]     Patient: Ryan Nguyen   YOB: 1995   Date: 3/31/2025   Visit Type:  Follow up    Body Part: foot right    What was the date of surgery? 11/21/24    Patient states his foot feels good.  Reports a tingling with bumping the foot into something, or when he puts his foot down oddly.  He can walk around all day at work with no issues.    Review of Systems    Review of Systems   Constitutional:  Negative for appetite change, chills, fatigue and fever.   Respiratory:  Negative for cough and shortness of breath.    Cardiovascular:  Negative for chest pain, palpitations and leg swelling.   Gastrointestinal:  Negative for blood in stool, constipation, diarrhea, nausea and vomiting.   Musculoskeletal:  Negative for arthralgias, gait problem and joint swelling.   Skin:  Negative for color change.   Neurological:  Negative for weakness.        
Screen    Received from [facility]    Housing Stability      Social History     Occupational History    Not on file   Tobacco Use    Smoking status: Former     Types: Cigarettes    Smokeless tobacco: Never   Substance and Sexual Activity    Alcohol use: Yes     Comment: occ    Drug use: Never    Sexual activity: Not on file        Tobacco Use      Smoking status: Former        Types: Cigarettes      Smokeless tobacco: Never     No family history on file.     Medications  Current Outpatient Medications   Medication Sig Dispense Refill    ibuprofen (ADVIL;MOTRIN) 800 MG tablet Take 1 tablet by mouth every 6 hours as needed for Pain      meloxicam (MOBIC) 15 MG tablet Take 1 tablet by mouth daily 30 tablet 0    ondansetron (ZOFRAN) 4 MG tablet Take 1 tablet by mouth every 8 hours as needed for Nausea or Vomiting 20 tablet 0    diclofenac (VOLTAREN) 75 MG EC tablet Take 1 tablet by mouth 2 times daily 60 tablet 3     No current facility-administered medications for this visit.        Allergies  No Known Allergies     Review of Systems  System  Neg/Pos  Details  Constitutional  Negative  Chills, Fatigue, Fever and Night Sweats  Respiratory  Negative  Chest Pain, Cough and Dyspnea  Cardio   Negative  Leg Swelling  GI   Negative  Abdominal Pain, Constipation, Nausea and Vomiting     Negative  Urinary Incontinence   Endocrine  Negative  Weight Gain and Weight Loss  MS   Negative  Except as noted in HPI and Chief Complaint    Ht 1.702 m (5' 7\")   Wt 90.7 kg (200 lb)   BMI 31.32 kg/m²      Physical Exam      Physical Exam   Physical Examination:  General: The patient is a Well Nourished 29 y.o. male who is alert and oriented x3 in no distress. The patient is cooperative during the exam.  Psychological: Is a pleasant male, good mood and affect, answers questions appropriately.  Head and Neck: Normocephalic, Atraumatic.   Respiratory: Rate and effort within normal limits.   Vascular: Negative Homans signs.